# Patient Record
Sex: MALE | Employment: FULL TIME | URBAN - METROPOLITAN AREA
[De-identification: names, ages, dates, MRNs, and addresses within clinical notes are randomized per-mention and may not be internally consistent; named-entity substitution may affect disease eponyms.]

---

## 2023-07-24 RX ORDER — ROSUVASTATIN CALCIUM 10 MG/1
1 TABLET, COATED ORAL NIGHTLY
COMMUNITY
Start: 2020-01-10 | End: 2023-07-25 | Stop reason: SDUPTHER

## 2023-07-24 RX ORDER — DEXTROAMPHETAMINE SACCHARATE, AMPHETAMINE ASPARTATE MONOHYDRATE, DEXTROAMPHETAMINE SULFATE AND AMPHETAMINE SULFATE 2.5; 2.5; 2.5; 2.5 MG/1; MG/1; MG/1; MG/1
1 CAPSULE, EXTENDED RELEASE ORAL DAILY
COMMUNITY
Start: 2018-11-17 | End: 2023-07-25 | Stop reason: SDUPTHER

## 2023-07-24 RX ORDER — LEVOCETIRIZINE DIHYDROCHLORIDE 5 MG/1
1 TABLET, FILM COATED ORAL DAILY
COMMUNITY
Start: 2020-04-27

## 2023-07-25 ENCOUNTER — OFFICE VISIT (OUTPATIENT)
Dept: PRIMARY CARE | Facility: CLINIC | Age: 33
End: 2023-07-25
Payer: COMMERCIAL

## 2023-07-25 VITALS
TEMPERATURE: 97.5 F | BODY MASS INDEX: 31.92 KG/M2 | SYSTOLIC BLOOD PRESSURE: 118 MMHG | HEIGHT: 70 IN | DIASTOLIC BLOOD PRESSURE: 78 MMHG | WEIGHT: 223 LBS

## 2023-07-25 DIAGNOSIS — Z79.899 MEDICATION MANAGEMENT: ICD-10-CM

## 2023-07-25 DIAGNOSIS — F90.0 ATTENTION DEFICIT HYPERACTIVITY DISORDER (ADHD), PREDOMINANTLY INATTENTIVE TYPE: ICD-10-CM

## 2023-07-25 DIAGNOSIS — Z00.00 ROUTINE ADULT HEALTH MAINTENANCE: Primary | ICD-10-CM

## 2023-07-25 DIAGNOSIS — E78.00 HYPERCHOLESTEROLEMIA: Primary | ICD-10-CM

## 2023-07-25 PROBLEM — F98.8 ADD (ATTENTION DEFICIT DISORDER): Status: ACTIVE | Noted: 2023-07-25

## 2023-07-25 PROBLEM — T78.40XA ALLERGIES: Status: ACTIVE | Noted: 2023-07-25

## 2023-07-25 PROBLEM — E55.9 MILD VITAMIN D DEFICIENCY: Status: ACTIVE | Noted: 2023-07-25

## 2023-07-25 PROBLEM — F41.9 ANXIETY: Status: ACTIVE | Noted: 2023-07-25

## 2023-07-25 LAB
AMPHETAMINE (PRESENCE) IN URINE BY SCREEN METHOD: ABNORMAL
BARBITURATES PRESENCE IN URINE BY SCREEN METHOD: ABNORMAL
BENZODIAZEPINE (PRESENCE) IN URINE BY SCREEN METHOD: ABNORMAL
CANNABINOIDS IN URINE BY SCREEN METHOD: ABNORMAL
COCAINE (PRESENCE) IN URINE BY SCREEN METHOD: ABNORMAL
DRUG SCREEN COMMENT URINE: ABNORMAL
FENTANYL URINE: ABNORMAL
METHADONE (PRESENCE) IN URINE BY SCREEN METHOD: ABNORMAL
OPIATES (PRESENCE) IN URINE BY SCREEN METHOD: ABNORMAL
OXYCODONE (PRESENCE) IN URINE BY SCREEN METHOD: ABNORMAL
PHENCYCLIDINE (PRESENCE) IN URINE BY SCREEN METHOD: ABNORMAL

## 2023-07-25 PROCEDURE — 80061 LIPID PANEL: CPT | Performed by: PHYSICIAN ASSISTANT

## 2023-07-25 PROCEDURE — 80349 CANNABINOIDS NATURAL: CPT

## 2023-07-25 PROCEDURE — 99395 PREV VISIT EST AGE 18-39: CPT | Performed by: PHYSICIAN ASSISTANT

## 2023-07-25 PROCEDURE — 80053 COMPREHEN METABOLIC PANEL: CPT | Performed by: PHYSICIAN ASSISTANT

## 2023-07-25 PROCEDURE — 1036F TOBACCO NON-USER: CPT | Performed by: PHYSICIAN ASSISTANT

## 2023-07-25 PROCEDURE — 80307 DRUG TEST PRSMV CHEM ANLYZR: CPT

## 2023-07-25 PROCEDURE — 85025 COMPLETE CBC W/AUTO DIFF WBC: CPT | Performed by: PHYSICIAN ASSISTANT

## 2023-07-25 RX ORDER — DEXTROAMPHETAMINE SACCHARATE, AMPHETAMINE ASPARTATE MONOHYDRATE, DEXTROAMPHETAMINE SULFATE AND AMPHETAMINE SULFATE 2.5; 2.5; 2.5; 2.5 MG/1; MG/1; MG/1; MG/1
10 CAPSULE, EXTENDED RELEASE ORAL DAILY
Qty: 30 CAPSULE | Refills: 0 | Status: SHIPPED | OUTPATIENT
Start: 2023-07-25 | End: 2023-09-07 | Stop reason: SDUPTHER

## 2023-07-25 RX ORDER — ROSUVASTATIN CALCIUM 10 MG/1
10 TABLET, COATED ORAL NIGHTLY
Qty: 90 TABLET | Refills: 3 | Status: SHIPPED | OUTPATIENT
Start: 2023-07-25

## 2023-07-25 ASSESSMENT — ENCOUNTER SYMPTOMS
DIZZINESS: 0
NAUSEA: 0
CHEST TIGHTNESS: 0
POLYPHAGIA: 0
FACIAL SWELLING: 0
TREMORS: 0
JOINT SWELLING: 0
EYE DISCHARGE: 0
CONSTIPATION: 0
CONFUSION: 0
COUGH: 0
HEMATURIA: 0
APPETITE CHANGE: 0
ABDOMINAL PAIN: 0
ARTHRALGIAS: 0
VOMITING: 0
ABDOMINAL DISTENTION: 0
ANAL BLEEDING: 0
CHOKING: 0
WHEEZING: 0
FEVER: 0
POLYDIPSIA: 0
NUMBNESS: 0
DIARRHEA: 0
EYE PAIN: 0
WEAKNESS: 0
LOSS OF SENSATION IN FEET: 0
MYALGIAS: 0
SHORTNESS OF BREATH: 0
DIFFICULTY URINATING: 0
NERVOUS/ANXIOUS: 0
FATIGUE: 0
DEPRESSION: 0
PALPITATIONS: 0
COLOR CHANGE: 0
FREQUENCY: 0
SORE THROAT: 0
SLEEP DISTURBANCE: 0
HEADACHES: 0
OCCASIONAL FEELINGS OF UNSTEADINESS: 0
CHILLS: 0

## 2023-07-25 ASSESSMENT — COLUMBIA-SUICIDE SEVERITY RATING SCALE - C-SSRS
6. HAVE YOU EVER DONE ANYTHING, STARTED TO DO ANYTHING, OR PREPARED TO DO ANYTHING TO END YOUR LIFE?: NO
2. HAVE YOU ACTUALLY HAD ANY THOUGHTS OF KILLING YOURSELF?: NO
1. IN THE PAST MONTH, HAVE YOU WISHED YOU WERE DEAD OR WISHED YOU COULD GO TO SLEEP AND NOT WAKE UP?: NO

## 2023-07-25 ASSESSMENT — PATIENT HEALTH QUESTIONNAIRE - PHQ9
2. FEELING DOWN, DEPRESSED OR HOPELESS: NOT AT ALL
SUM OF ALL RESPONSES TO PHQ9 QUESTIONS 1 AND 2: 0
1. LITTLE INTEREST OR PLEASURE IN DOING THINGS: NOT AT ALL

## 2023-07-25 NOTE — PROGRESS NOTES
Subjective   Patient ID: Mane Kay is a 32 y.o. male with a history of ADD and familial hypercholesterolemia who presents for Annual Exam.    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  He would like to go back on Adderall.  Of note, the patient is off of Adderall for about a year.  The patient ran out of rosuvastatin a month ago. He does not exercise since he had a baby 6 months ago.  Stated he is pretty busy with a new job and a baby.  He is currently on intermittent fasting diet.  He denies smoking, drinking alcohol or using illicit drugs.    He denies shortness of breath or chest pain.  There is no nausea, vomiting, constipation, or diarrhea.  No abdominal pain.  Denies headaches, dizziness, presyncope, or syncope.    Review of Systems   Constitutional:  Negative for appetite change, chills, fatigue and fever.   HENT:  Negative for congestion, ear pain, facial swelling, hearing loss, nosebleeds and sore throat.    Eyes:  Negative for pain, discharge and visual disturbance.   Respiratory:  Negative for cough, choking, chest tightness, shortness of breath and wheezing.    Cardiovascular:  Negative for chest pain, palpitations and leg swelling.   Gastrointestinal:  Negative for abdominal distention, abdominal pain, anal bleeding, constipation, diarrhea, nausea and vomiting.   Endocrine: Negative for cold intolerance, heat intolerance, polydipsia, polyphagia and polyuria.   Genitourinary:  Negative for difficulty urinating, frequency, hematuria and urgency.   Musculoskeletal:  Negative for arthralgias, gait problem, joint swelling and myalgias.   Skin:  Negative for color change and rash.   Neurological:  Negative for dizziness, tremors, syncope, weakness, numbness and headaches.   Psychiatric/Behavioral:  Negative for behavioral problems, confusion, sleep disturbance and suicidal ideas. The patient is not nervous/anxious.        Objective   BP  "118/78   Temp 36.4 °C (97.5 °F)   Ht 1.778 m (5' 10\")   Wt 101 kg (223 lb)   BMI 32.00 kg/m²     Physical Exam  Constitutional:       General: He is not in acute distress.     Appearance: Normal appearance.   HENT:      Head: Normocephalic and atraumatic.      Nose: Nose normal.   Eyes:      Extraocular Movements: Extraocular movements intact.      Conjunctiva/sclera: Conjunctivae normal.      Pupils: Pupils are equal, round, and reactive to light.   Cardiovascular:      Rate and Rhythm: Normal rate and regular rhythm.      Pulses: Normal pulses.      Heart sounds: Normal heart sounds.   Pulmonary:      Effort: Pulmonary effort is normal.      Breath sounds: Normal breath sounds.   Abdominal:      General: Bowel sounds are normal.      Palpations: Abdomen is soft.   Musculoskeletal:         General: Normal range of motion.      Cervical back: Normal range of motion and neck supple.   Neurological:      General: No focal deficit present.      Mental Status: He is alert and oriented to person, place, and time.   Psychiatric:         Mood and Affect: Mood normal.         Behavior: Behavior normal.         Thought Content: Thought content normal.         Judgment: Judgment normal.         Assessment/Plan     Complete physical exam  We obtained fasting blood work including CBC, CMP, lipid panel  Try to exercise 30 minutes daily  Continue intermittent fasting diet    ADD  Unable to focus and concentrate  Was on Adderall XR 10 mg  Unable to tolerate higher doses due to anxiety and jitters  Start Adderall XR 10 mg daily  OARRS reviewed today  Triple threat - No  Polypharmacy  - No   Morphine equivalent dose greater than 80 ? no  overdose Risk: :Less than 400  Urine drug screen today 7/25/2023  Agreement signed today 7/25/2023  Controlled substances History of present illness note form -Yes    Anxiety, panic attacks.  Doing well without medications  Unable to tolerate buspirone due to dizziness and headaches  Continue to " meditate    Hypercholesterolemia  Ran out of rosuvastatin 10 mg daily a month ago  We will obtain lipid panel today  Try to be on low-fat, low calorie diet recommended  Try to exercise at least 30 minutes daily    Seasonal allergies  Failed OTC Benadryl, Zyrtec, Claritin  Continue Xyzal 5 mg once daily  Continue fluticasone nasal spray 2 sprays in each nostril daily  Follow-up with allergy for injections    Vitamin D deficiency  not on vitamin D    Weight maintenance.  BMI 32.00 kg/m²  Low fat, low cholesterol diet, low carbohydrate diet  Exercise 30 minutes daily

## 2023-08-01 LAB — 11-NOR-9-CARBOXY-THC, URN, QUANT: 35 NG/ML

## 2023-08-16 ASSESSMENT — ENCOUNTER SYMPTOMS
FEVER: 0
CONSTIPATION: 0
COUGH: 0
FATIGUE: 0
CHEST TIGHTNESS: 0
ABDOMINAL PAIN: 0
APPETITE CHANGE: 0
POLYDIPSIA: 0
ARTHRALGIAS: 0
DIARRHEA: 0
FACIAL SWELLING: 0
NERVOUS/ANXIOUS: 0
SHORTNESS OF BREATH: 0
DIZZINESS: 0
CHOKING: 0
TREMORS: 0
WEAKNESS: 0
VOMITING: 0
WHEEZING: 0
HEADACHES: 0
CHILLS: 0
NAUSEA: 0
JOINT SWELLING: 0
EYE DISCHARGE: 0
FREQUENCY: 0
PALPITATIONS: 0
ABDOMINAL DISTENTION: 0
SORE THROAT: 0
NUMBNESS: 0
POLYPHAGIA: 0
DIFFICULTY URINATING: 0
EYE PAIN: 0
COLOR CHANGE: 0
SLEEP DISTURBANCE: 0
ANAL BLEEDING: 0
HEMATURIA: 0
MYALGIAS: 0
CONFUSION: 0

## 2023-08-17 ENCOUNTER — APPOINTMENT (OUTPATIENT)
Dept: PRIMARY CARE | Facility: CLINIC | Age: 33
End: 2023-08-17
Payer: COMMERCIAL

## 2023-08-17 ENCOUNTER — TELEMEDICINE (OUTPATIENT)
Dept: PRIMARY CARE | Facility: CLINIC | Age: 33
End: 2023-08-17
Payer: COMMERCIAL

## 2023-08-17 DIAGNOSIS — J32.4 CHRONIC PANSINUSITIS: Primary | ICD-10-CM

## 2023-08-17 PROBLEM — J33.8 NASAL SINUS POLYP: Status: ACTIVE | Noted: 2018-03-07

## 2023-08-17 PROBLEM — J34.3 HYPERTROPHY OF NASAL TURBINATES: Status: ACTIVE | Noted: 2018-03-07

## 2023-08-17 PROCEDURE — 99213 OFFICE O/P EST LOW 20 MIN: CPT | Performed by: PHYSICIAN ASSISTANT

## 2023-08-17 RX ORDER — AZITHROMYCIN 250 MG/1
TABLET, FILM COATED ORAL
Qty: 6 TABLET | Refills: 0 | Status: SHIPPED | OUTPATIENT
Start: 2023-08-17 | End: 2023-08-22

## 2023-08-17 ASSESSMENT — ENCOUNTER SYMPTOMS
COLOR CHANGE: 0
CONSTIPATION: 0
HEMATURIA: 0
ABDOMINAL DISTENTION: 0
SHORTNESS OF BREATH: 0
WHEEZING: 0
DIARRHEA: 0
CHOKING: 0
MYALGIAS: 0
POLYPHAGIA: 0
CONFUSION: 0
NERVOUS/ANXIOUS: 0
DIFFICULTY URINATING: 0
DIZZINESS: 0
PALPITATIONS: 0
SINUS PAIN: 1
FATIGUE: 1
NUMBNESS: 0
FACIAL SWELLING: 0
NAUSEA: 0
COUGH: 0
EYE PAIN: 0
CHEST TIGHTNESS: 0
JOINT SWELLING: 0
VOMITING: 0
SINUS PRESSURE: 1
POLYDIPSIA: 0
ANAL BLEEDING: 0
ABDOMINAL PAIN: 0
ARTHRALGIAS: 0
TREMORS: 0
EYE DISCHARGE: 0
APPETITE CHANGE: 0
WEAKNESS: 0
FEVER: 1
SLEEP DISTURBANCE: 0
FREQUENCY: 0
HEADACHES: 0
SORE THROAT: 1
CHILLS: 0

## 2023-08-17 NOTE — PROGRESS NOTES
Subjective   Patient ID: Mane Kay is a 32 y.o. male with a history of ADD and familial hypercholesterolemia who presents for Sinusitis and Sore Throat.    Virtual or Telephone Consent    An interactive audio and video telecommunication system which permits real time communications between the patient (at the originating site) and provider (at the distant site) was utilized to provide this telehealth service.   Verbal consent was requested and obtained from Mane Kay on this date, 08/17/23 for a telehealth visit.       HPI the patient is complaining of nasal congestion with green nasal discharge, low fever 99.9-100, sore throat, fatigue, and headaches.  He stated he was exposed to his son who was tested negative for COVID and flu.  Per patient, he tested negative for COVID-19 virus as well.  He denies cough, wheezing, shortness of breath or difficulty breathing.    Review of Systems   Constitutional:  Positive for fatigue and fever. Negative for appetite change and chills.   HENT:  Positive for congestion, sinus pressure, sinus pain and sore throat. Negative for ear pain, facial swelling, hearing loss and nosebleeds.    Eyes:  Negative for pain, discharge and visual disturbance.   Respiratory:  Negative for cough, choking, chest tightness, shortness of breath and wheezing.    Cardiovascular:  Negative for chest pain, palpitations and leg swelling.   Gastrointestinal:  Negative for abdominal distention, abdominal pain, anal bleeding, constipation, diarrhea, nausea and vomiting.   Endocrine: Negative for cold intolerance, heat intolerance, polydipsia, polyphagia and polyuria.   Genitourinary:  Negative for difficulty urinating, frequency, hematuria and urgency.   Musculoskeletal:  Negative for arthralgias, gait problem, joint swelling and myalgias.   Skin:  Negative for color change and rash.   Neurological:  Negative for dizziness, tremors, syncope, weakness, numbness and headaches.   Psychiatric/Behavioral:   Negative for behavioral problems, confusion, sleep disturbance and suicidal ideas. The patient is not nervous/anxious.        Objective   There were no vitals taken for this visit.    Physical Exam    Assessment/Plan   Recurrent sinusitis  Start Z-Eduar as directed  Take OTC decongestant  Call if not better    ADD  Unable to focus and concentrate  Was on Adderall XR 10 mg  Unable to tolerate higher doses due to anxiety and jitters  Start Adderall XR 10 mg daily  OARRS reviewed today  Triple threat - No  Polypharmacy  - No   Morphine equivalent dose greater than 80 ? no  overdose Risk: :Less than 400  Urine drug screen today 7/25/2023  Agreement signed today 7/25/2023  Controlled substances History of present illness note form -Yes    Anxiety, panic attacks.  Doing well without medications  Unable to tolerate buspirone due to dizziness and headaches  Continue to meditate    Hypercholesterolemia  Ran out of rosuvastatin 10 mg daily a month ago  We will obtain lipid panel today  Try to be on low-fat, low calorie diet recommended  Try to exercise at least 30 minutes daily    Seasonal allergies  Failed OTC Benadryl, Zyrtec, Claritin  Continue Xyzal 5 mg once daily  Continue fluticasone nasal spray 2 sprays in each nostril daily  Follow-up with allergy for injections    Vitamin D deficiency  not on vitamin D    Weight maintenance.  BMI 32.00 kg/m²  Low fat, low cholesterol diet, low carbohydrate diet  Try to exercise 30 minutes daily  Continue intermittent fasting diet

## 2023-08-17 NOTE — PROGRESS NOTES
Subjective   Patient ID: Mane Kay is a 32 y.o. male with a history of ADD and familial hypercholesterolemia who presents for No chief complaint on file..    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  He would like to go back on Adderall.  Of note, the patient is off of Adderall for about a year.  The patient ran out of rosuvastatin a month ago. He does not exercise since he had a baby 6 months ago.  Stated he is pretty busy with a new job and a baby.  He is currently on intermittent fasting diet.  He denies smoking, drinking alcohol or using illicit drugs.    He denies shortness of breath or chest pain.  There is no nausea, vomiting, constipation, or diarrhea.  No abdominal pain.  Denies headaches, dizziness, presyncope, or syncope.    Review of Systems   Constitutional:  Negative for appetite change, chills, fatigue and fever.   HENT:  Negative for congestion, ear pain, facial swelling, hearing loss, nosebleeds and sore throat.    Eyes:  Negative for pain, discharge and visual disturbance.   Respiratory:  Negative for cough, choking, chest tightness, shortness of breath and wheezing.    Cardiovascular:  Negative for chest pain, palpitations and leg swelling.   Gastrointestinal:  Negative for abdominal distention, abdominal pain, anal bleeding, constipation, diarrhea, nausea and vomiting.   Endocrine: Negative for cold intolerance, heat intolerance, polydipsia, polyphagia and polyuria.   Genitourinary:  Negative for difficulty urinating, frequency, hematuria and urgency.   Musculoskeletal:  Negative for arthralgias, gait problem, joint swelling and myalgias.   Skin:  Negative for color change and rash.   Neurological:  Negative for dizziness, tremors, syncope, weakness, numbness and headaches.   Psychiatric/Behavioral:  Negative for behavioral problems, confusion, sleep disturbance and suicidal ideas. The patient is not nervous/anxious.         Objective   There were no vitals taken for this visit.    Physical Exam  Constitutional:       General: He is not in acute distress.     Appearance: Normal appearance.   HENT:      Head: Normocephalic and atraumatic.      Nose: Nose normal.   Eyes:      Extraocular Movements: Extraocular movements intact.      Conjunctiva/sclera: Conjunctivae normal.      Pupils: Pupils are equal, round, and reactive to light.   Cardiovascular:      Rate and Rhythm: Normal rate and regular rhythm.      Pulses: Normal pulses.      Heart sounds: Normal heart sounds.   Pulmonary:      Effort: Pulmonary effort is normal.      Breath sounds: Normal breath sounds.   Abdominal:      General: Bowel sounds are normal.      Palpations: Abdomen is soft.   Musculoskeletal:         General: Normal range of motion.      Cervical back: Normal range of motion and neck supple.   Neurological:      General: No focal deficit present.      Mental Status: He is alert and oriented to person, place, and time.   Psychiatric:         Mood and Affect: Mood normal.         Behavior: Behavior normal.         Thought Content: Thought content normal.         Judgment: Judgment normal.         Assessment/Plan     Complete physical exam  We obtained fasting blood work including CBC, CMP, lipid panel  Try to exercise 30 minutes daily  Continue intermittent fasting diet    ADD  Unable to focus and concentrate  Was on Adderall XR 10 mg  Unable to tolerate higher doses due to anxiety and jitters  Start Adderall XR 10 mg daily  OARRS reviewed today  Triple threat - No  Polypharmacy  - No   Morphine equivalent dose greater than 80 ? no  overdose Risk: :Less than 400  Urine drug screen today 7/25/2023  Agreement signed today 7/25/2023  Controlled substances History of present illness note form -Yes    Anxiety, panic attacks.  Doing well without medications  Unable to tolerate buspirone due to dizziness and headaches  Continue to  meditate    Hypercholesterolemia  Ran out of rosuvastatin 10 mg daily a month ago  We will obtain lipid panel today  Try to be on low-fat, low calorie diet recommended  Try to exercise at least 30 minutes daily    Seasonal allergies  Failed OTC Benadryl, Zyrtec, Claritin  Continue Xyzal 5 mg once daily  Continue fluticasone nasal spray 2 sprays in each nostril daily  Follow-up with allergy for injections    Vitamin D deficiency  not on vitamin D    Weight maintenance.  BMI 32.00 kg/m²  Low fat, low cholesterol diet, low carbohydrate diet  Exercise 30 minutes daily

## 2023-09-07 DIAGNOSIS — F90.0 ATTENTION DEFICIT HYPERACTIVITY DISORDER (ADHD), PREDOMINANTLY INATTENTIVE TYPE: ICD-10-CM

## 2023-09-07 RX ORDER — DEXTROAMPHETAMINE SACCHARATE, AMPHETAMINE ASPARTATE MONOHYDRATE, DEXTROAMPHETAMINE SULFATE AND AMPHETAMINE SULFATE 2.5; 2.5; 2.5; 2.5 MG/1; MG/1; MG/1; MG/1
10 CAPSULE, EXTENDED RELEASE ORAL DAILY
Qty: 30 CAPSULE | Refills: 0 | Status: SHIPPED | OUTPATIENT
Start: 2023-09-07 | End: 2023-12-07 | Stop reason: SDUPTHER

## 2023-12-06 ENCOUNTER — TELEPHONE (OUTPATIENT)
Dept: PRIMARY CARE | Facility: CLINIC | Age: 33
End: 2023-12-06
Payer: COMMERCIAL

## 2023-12-07 DIAGNOSIS — F90.0 ATTENTION DEFICIT HYPERACTIVITY DISORDER (ADHD), PREDOMINANTLY INATTENTIVE TYPE: ICD-10-CM

## 2023-12-07 RX ORDER — DEXTROAMPHETAMINE SACCHARATE, AMPHETAMINE ASPARTATE MONOHYDRATE, DEXTROAMPHETAMINE SULFATE AND AMPHETAMINE SULFATE 2.5; 2.5; 2.5; 2.5 MG/1; MG/1; MG/1; MG/1
10 CAPSULE, EXTENDED RELEASE ORAL DAILY
Qty: 30 CAPSULE | Refills: 0 | Status: SHIPPED | OUTPATIENT
Start: 2023-12-07

## 2025-01-26 RX ORDER — LIDOCAINE HYDROCHLORIDE 20 MG/ML
SOLUTION OROPHARYNGEAL
COMMUNITY
Start: 2024-02-12 | End: 2025-02-05 | Stop reason: ALTCHOICE

## 2025-01-26 RX ORDER — AZELASTINE HYDROCHLORIDE 0.5 MG/ML
SOLUTION/ DROPS OPHTHALMIC
COMMUNITY
Start: 2024-04-08

## 2025-01-26 RX ORDER — ONDANSETRON 4 MG/1
4 TABLET, FILM COATED ORAL EVERY 8 HOURS PRN
COMMUNITY
Start: 2024-05-04

## 2025-02-05 ENCOUNTER — APPOINTMENT (OUTPATIENT)
Dept: PRIMARY CARE | Facility: CLINIC | Age: 35
End: 2025-02-05
Payer: COMMERCIAL

## 2025-02-05 VITALS
HEIGHT: 70 IN | DIASTOLIC BLOOD PRESSURE: 78 MMHG | BODY MASS INDEX: 29.2 KG/M2 | TEMPERATURE: 97.7 F | SYSTOLIC BLOOD PRESSURE: 118 MMHG | WEIGHT: 204 LBS

## 2025-02-05 DIAGNOSIS — Z00.00 ROUTINE ADULT HEALTH MAINTENANCE: Primary | ICD-10-CM

## 2025-02-05 LAB
ALBUMIN SERPL BCP-MCNC: 4.7 G/DL (ref 3.4–5)
ALP SERPL-CCNC: 51 U/L (ref 45–117)
ALT SERPL W P-5'-P-CCNC: 47 U/L (ref 14–59)
ANION GAP SERPL CALC-SCNC: 14 MMOL/L (ref 10–20)
AST SERPL W P-5'-P-CCNC: 24 U/L (ref 15–37)
BASOPHILS # BLD AUTO: 0.01 X10*3/UL (ref 0.1–1.6)
BASOPHILS NFR BLD AUTO: 0.23 % (ref 0–0.3)
BILIRUB SERPL-MCNC: 1.4 MG/DL (ref 0.2–1)
BUN SERPL-MCNC: 11 MG/DL (ref 7–18)
CALCIUM SERPL-MCNC: 9.7 MG/DL (ref 8.5–10.1)
CHLORIDE SERPL-SCNC: 101 MMOL/L (ref 98–107)
CHOLEST SERPL-MCNC: 260 MG/DL (ref 0–199)
CHOLESTEROL/HDL RATIO: 4.3 (ref 4.2–7)
CO2 SERPL-SCNC: 30 MMOL/L (ref 21–32)
CREAT SERPL-MCNC: 0.97 MG/DL (ref 0.6–1.1)
EGFRCR SERPLBLD CKD-EPI 2021: >90 ML/MIN/1.73M*2
EOSINOPHIL # BLD AUTO: 0.2 X10*3/UL (ref 0.04–0.5)
EOSINOPHIL NFR BLD AUTO: 3.73 % (ref 0.7–7)
ERYTHROCYTE [DISTWIDTH] IN BLOOD BY AUTOMATED COUNT: 13.4 % (ref 11.5–14.5)
GLUCOSE SERPL-MCNC: 93 MG/DL (ref 74–100)
HCT VFR BLD AUTO: 45.2 % (ref 38.4–51.3)
HDLC SERPL-MCNC: 60 MG/DL (ref 40–59)
HGB BLD-MCNC: 16.31 G/DL (ref 12.7–17)
IS PATIENT FASTING: YES
LDLC SERPL DIRECT ASSAY-MCNC: 163 MG/DL (ref 0–100)
LYMPHOCYTES # BLD AUTO: 2.12 X10*3/UL (ref 0–6)
LYMPHOCYTES NFR BLD AUTO: 38.81 % (ref 20.5–51.1)
MCH RBC QN AUTO: 31.5 PG (ref 26–32)
MCHC RBC AUTO-ENTMCNC: 36.1 G/DL (ref 31–38)
MCV RBC AUTO: 87.5 FL (ref 80–96)
MONOCYTES # BLD AUTO: 0.55 X10*3/UL (ref 1.6–24.9)
MONOCYTES NFR BLD AUTO: 10.09 % (ref 1.7–9.3)
NEUTROPHILS # BLD AUTO: 2.58 X10*3/UL (ref 1.4–6.5)
NEUTROPHILS NFR BLD AUTO: 47.14 % (ref 42.2–75.2)
PLATELET # BLD AUTO: 291.3 X10*3/UL (ref 150–450)
PMV BLD AUTO: 8.28 FL (ref 7.8–11)
POTASSIUM SERPL-SCNC: 4.6 MMOL/L (ref 3.5–5.1)
PROT SERPL-MCNC: 8.2 G/DL (ref 6.4–8.2)
RBC # BLD AUTO: 5.17 X10*6/UL (ref 4.1–5.6)
SODIUM SERPL-SCNC: 140 MMOL/L (ref 136–145)
TRIGL SERPL-MCNC: 149 MG/DL
WBC # BLD AUTO: 5.47 X10*3/UL (ref 4.5–10.5)

## 2025-02-05 PROCEDURE — 99395 PREV VISIT EST AGE 18-39: CPT | Performed by: PHYSICIAN ASSISTANT

## 2025-02-05 PROCEDURE — 80061 LIPID PANEL: CPT | Performed by: PHYSICIAN ASSISTANT

## 2025-02-05 PROCEDURE — 80053 COMPREHEN METABOLIC PANEL: CPT | Performed by: PHYSICIAN ASSISTANT

## 2025-02-05 PROCEDURE — 3008F BODY MASS INDEX DOCD: CPT | Performed by: PHYSICIAN ASSISTANT

## 2025-02-05 PROCEDURE — 1036F TOBACCO NON-USER: CPT | Performed by: PHYSICIAN ASSISTANT

## 2025-02-05 PROCEDURE — 85025 COMPLETE CBC W/AUTO DIFF WBC: CPT | Performed by: PHYSICIAN ASSISTANT

## 2025-02-05 RX ORDER — ALBUTEROL SULFATE 90 UG/1
INHALANT RESPIRATORY (INHALATION)
COMMUNITY
Start: 2025-01-02

## 2025-02-05 ASSESSMENT — ENCOUNTER SYMPTOMS
VOMITING: 0
MYALGIAS: 0
CONSTIPATION: 0
HEADACHES: 0
CHOKING: 0
WHEEZING: 0
OCCASIONAL FEELINGS OF UNSTEADINESS: 0
POLYPHAGIA: 0
ARTHRALGIAS: 0
APPETITE CHANGE: 0
PALPITATIONS: 0
NUMBNESS: 0
SORE THROAT: 0
NERVOUS/ANXIOUS: 0
COLOR CHANGE: 0
DEPRESSION: 0
LOSS OF SENSATION IN FEET: 0
ABDOMINAL PAIN: 0
DIZZINESS: 0
EYE DISCHARGE: 0
COUGH: 0
EYE PAIN: 0
DIARRHEA: 0
POLYDIPSIA: 0
JOINT SWELLING: 0
ABDOMINAL DISTENTION: 0
FEVER: 0
CHEST TIGHTNESS: 0
TREMORS: 0
NAUSEA: 0
CONFUSION: 0
FATIGUE: 0
DIFFICULTY URINATING: 0
FREQUENCY: 0
HEMATURIA: 0
FACIAL SWELLING: 0
CHILLS: 0
SHORTNESS OF BREATH: 0
SLEEP DISTURBANCE: 0
ANAL BLEEDING: 0
WEAKNESS: 0

## 2025-02-05 ASSESSMENT — PATIENT HEALTH QUESTIONNAIRE - PHQ9
2. FEELING DOWN, DEPRESSED OR HOPELESS: NOT AT ALL
1. LITTLE INTEREST OR PLEASURE IN DOING THINGS: NOT AT ALL
SUM OF ALL RESPONSES TO PHQ9 QUESTIONS 1 AND 2: 0

## 2025-02-05 ASSESSMENT — COLUMBIA-SUICIDE SEVERITY RATING SCALE - C-SSRS
2. HAVE YOU ACTUALLY HAD ANY THOUGHTS OF KILLING YOURSELF?: NO
1. IN THE PAST MONTH, HAVE YOU WISHED YOU WERE DEAD OR WISHED YOU COULD GO TO SLEEP AND NOT WAKE UP?: NO
6. HAVE YOU EVER DONE ANYTHING, STARTED TO DO ANYTHING, OR PREPARED TO DO ANYTHING TO END YOUR LIFE?: NO

## 2025-02-05 ASSESSMENT — PAIN SCALES - GENERAL: PAINLEVEL_OUTOF10: 0-NO PAIN

## 2025-02-05 NOTE — PROGRESS NOTES
"Subjective   Patient ID: Mane Kay is a 34 y.o. male with a history of ADD and familial hypercholesterolemia who presents for Annual Exam.    HPI the patient is presented for physical exam.  He lost almost 20 pounds by diet.  He does not exercise but tries to be active in summertime. He stopped taking rosuvastatin and Adderall.  Stated that he gets a bad crash from Adderall at the end of the day.  Has hard time focusing and concentrating.  He denies anxiety or depression.  He sleeps well through the night.  Today he denies shortness of breath, chest pain, nausea, vomiting, constipation, diarrhea or abdominal pain.    Review of Systems   Constitutional:  Negative for appetite change, chills, fatigue and fever.   HENT:  Negative for congestion, ear pain, facial swelling, hearing loss, nosebleeds and sore throat.    Eyes:  Negative for pain, discharge and visual disturbance.   Respiratory:  Negative for cough, choking, chest tightness, shortness of breath and wheezing.    Cardiovascular:  Negative for chest pain, palpitations and leg swelling.   Gastrointestinal:  Negative for abdominal distention, abdominal pain, anal bleeding, constipation, diarrhea, nausea and vomiting.   Endocrine: Negative for cold intolerance, heat intolerance, polydipsia, polyphagia and polyuria.   Genitourinary:  Negative for difficulty urinating, frequency, hematuria and urgency.   Musculoskeletal:  Negative for arthralgias, gait problem, joint swelling and myalgias.   Skin:  Negative for color change and rash.   Neurological:  Negative for dizziness, tremors, syncope, weakness, numbness and headaches.   Psychiatric/Behavioral:  Negative for behavioral problems, confusion, sleep disturbance and suicidal ideas. The patient is not nervous/anxious.        Objective   /78   Temp 36.5 °C (97.7 °F) (Temporal)   Ht 1.778 m (5' 10\")   Wt 92.5 kg (204 lb)   BMI 29.27 kg/m²     Physical Exam  Constitutional:       General: He is not in " acute distress.     Appearance: Normal appearance.   HENT:      Head: Normocephalic and atraumatic.      Nose: Nose normal.   Eyes:      Extraocular Movements: Extraocular movements intact.      Conjunctiva/sclera: Conjunctivae normal.      Pupils: Pupils are equal, round, and reactive to light.   Cardiovascular:      Rate and Rhythm: Normal rate and regular rhythm.      Pulses: Normal pulses.      Heart sounds: Normal heart sounds.   Pulmonary:      Effort: Pulmonary effort is normal.      Breath sounds: Normal breath sounds.   Abdominal:      General: Bowel sounds are normal.      Palpations: Abdomen is soft.   Musculoskeletal:         General: Normal range of motion.      Cervical back: Normal range of motion and neck supple.   Neurological:      General: No focal deficit present.      Mental Status: He is alert and oriented to person, place, and time.   Psychiatric:         Mood and Affect: Mood normal.         Behavior: Behavior normal.         Thought Content: Thought content normal.         Judgment: Judgment normal.             Assessment/Plan   Complete physical exam.  We obtained fasting blood work including CBC, CMP, lipid panel.  Exercise at least 30 minutes daily  Continue healthy diet  Follow up with dentist twice a year for dental cleaning     ADD  Off of Adderall XR 10 mg  Has a bed crash at the end of the day  Will consider Vyvanse     Anxiety, panic attacks.  Doing well without medications  Unable to tolerate buspirone due to dizziness and headaches  Continue to meditate    Hypercholesterolemia  Off of rosuvastatin 10 mg daily   low-fat, low calorie diet recommended  Try to exercise at least 30 minutes daily    Seasonal allergies  Failed OTC Benadryl, Zyrtec, Claritin  Continue Xyzal 5 mg as needed  Follow-up with allergy for injections    Vitamin D deficiency  not on vitamin D    Weight maintenance.  BMI 32.00 kg/m²  Low fat, low cholesterol diet, low carbohydrate diet  Try to exercise 30 minutes  daily  Continue intermittent fasting diet    I will call with results    Follow-up annually or as needed

## 2025-03-06 ENCOUNTER — APPOINTMENT (OUTPATIENT)
Dept: PRIMARY CARE | Facility: CLINIC | Age: 35
End: 2025-03-06
Payer: COMMERCIAL

## 2025-03-06 VITALS
DIASTOLIC BLOOD PRESSURE: 72 MMHG | BODY MASS INDEX: 29.2 KG/M2 | TEMPERATURE: 97.7 F | SYSTOLIC BLOOD PRESSURE: 110 MMHG | HEIGHT: 70 IN | WEIGHT: 204 LBS

## 2025-03-06 DIAGNOSIS — F90.0 ATTENTION DEFICIT HYPERACTIVITY DISORDER (ADHD), PREDOMINANTLY INATTENTIVE TYPE: ICD-10-CM

## 2025-03-06 PROCEDURE — 1036F TOBACCO NON-USER: CPT | Performed by: PHYSICIAN ASSISTANT

## 2025-03-06 PROCEDURE — 3008F BODY MASS INDEX DOCD: CPT | Performed by: PHYSICIAN ASSISTANT

## 2025-03-06 PROCEDURE — 99214 OFFICE O/P EST MOD 30 MIN: CPT | Performed by: PHYSICIAN ASSISTANT

## 2025-03-06 RX ORDER — DEXTROAMPHETAMINE SACCHARATE, AMPHETAMINE ASPARTATE MONOHYDRATE, DEXTROAMPHETAMINE SULFATE AND AMPHETAMINE SULFATE 2.5; 2.5; 2.5; 2.5 MG/1; MG/1; MG/1; MG/1
10 CAPSULE, EXTENDED RELEASE ORAL DAILY
Qty: 30 CAPSULE | Refills: 0 | Status: SHIPPED | OUTPATIENT
Start: 2025-03-06

## 2025-03-06 ASSESSMENT — ENCOUNTER SYMPTOMS
DIZZINESS: 0
SORE THROAT: 0
ABDOMINAL PAIN: 0
POLYDIPSIA: 0
APPETITE CHANGE: 0
DIARRHEA: 0
TREMORS: 0
CONFUSION: 0
PALPITATIONS: 0
HEMATURIA: 0
POLYPHAGIA: 0
NUMBNESS: 0
ABDOMINAL DISTENTION: 0
EYE DISCHARGE: 0
CHOKING: 0
FEVER: 0
FATIGUE: 0
HEADACHES: 0
FACIAL SWELLING: 0
EYE PAIN: 0
COUGH: 0
CHEST TIGHTNESS: 0
ARTHRALGIAS: 0
SLEEP DISTURBANCE: 0
CONSTIPATION: 0
CHILLS: 0
DIFFICULTY URINATING: 0
MYALGIAS: 0
FREQUENCY: 0
VOMITING: 0
WEAKNESS: 0
NERVOUS/ANXIOUS: 0
JOINT SWELLING: 0
SHORTNESS OF BREATH: 0
NAUSEA: 0
COLOR CHANGE: 0
ANAL BLEEDING: 0
WHEEZING: 0

## 2025-03-06 ASSESSMENT — COLUMBIA-SUICIDE SEVERITY RATING SCALE - C-SSRS
6. HAVE YOU EVER DONE ANYTHING, STARTED TO DO ANYTHING, OR PREPARED TO DO ANYTHING TO END YOUR LIFE?: NO
1. IN THE PAST MONTH, HAVE YOU WISHED YOU WERE DEAD OR WISHED YOU COULD GO TO SLEEP AND NOT WAKE UP?: NO
2. HAVE YOU ACTUALLY HAD ANY THOUGHTS OF KILLING YOURSELF?: NO

## 2025-03-06 ASSESSMENT — PAIN SCALES - GENERAL: PAINLEVEL_OUTOF10: 0-NO PAIN

## 2025-03-06 NOTE — PROGRESS NOTES
Subjective   Patient ID: Mane Kay is a 34 y.o. male with a history of ADD and familial hypercholesterolemia who presents for Med Refill (Amphetamine).    HPI the patient is presented with complaints of not being able to focus and concentrate since he is off of Adderall.  His last refill was more than a year ago and stated he had hard time at work. He mostly needs it when he is at work and he does not take it during weekends.  OARRS:  Nona Bowden PA-C on 3/6/2025  9:36 AM  I have personally reviewed the OARRS report for Mane Kay. I have considered the risks of abuse, dependence, addiction and diversion    Is the patient prescribed a combination of a benzodiazepine and opioid?  No    Last Urine Drug Screen / ordered today: Yes  No results found for this or any previous visit (from the past 8760 hours).  N/A    Controlled Substance Agreement:  Date of the Last Agreement: 3/6/2025  Reviewed Controlled Substance Agreement including but not limited to the benefits, risks, and alternatives to treatment with a Controlled Substance medication(s).    Stimulants:   What is the patient's goal of therapy?  To improve focus and concentration  Is this being achieved with current treatment?  Yes    Activities of Daily Living:   Is your overall impression that this patient is benefiting (symptom reduction outweighs side effects) from stimulant therapy? Yes     1. Physical Functioning: Better  2. Family Relationship: Same  3. Social Relationship: Same  4. Mood: Better  5. Sleep Patterns: Same  6. Overall Function: Better    Review of Systems   Constitutional:  Negative for appetite change, chills, fatigue and fever.   HENT:  Negative for congestion, ear pain, facial swelling, hearing loss, nosebleeds and sore throat.    Eyes:  Negative for pain, discharge and visual disturbance.   Respiratory:  Negative for cough, choking, chest tightness, shortness of breath and wheezing.    Cardiovascular:  Negative for chest pain,  "palpitations and leg swelling.   Gastrointestinal:  Negative for abdominal distention, abdominal pain, anal bleeding, constipation, diarrhea, nausea and vomiting.   Endocrine: Negative for cold intolerance, heat intolerance, polydipsia, polyphagia and polyuria.   Genitourinary:  Negative for difficulty urinating, frequency, hematuria and urgency.   Musculoskeletal:  Negative for arthralgias, gait problem, joint swelling and myalgias.   Skin:  Negative for color change and rash.   Neurological:  Negative for dizziness, tremors, syncope, weakness, numbness and headaches.   Psychiatric/Behavioral:  Negative for behavioral problems, confusion, sleep disturbance and suicidal ideas. The patient is not nervous/anxious.        Objective   /72 (Patient Position: Sitting)   Temp 36.5 °C (97.7 °F) (Temporal)   Ht 1.778 m (5' 10\")   Wt 92.5 kg (204 lb)   BMI 29.27 kg/m²     Physical Exam  Constitutional:       General: He is not in acute distress.     Appearance: Normal appearance.   HENT:      Head: Normocephalic and atraumatic.      Nose: Nose normal.   Eyes:      Extraocular Movements: Extraocular movements intact.      Conjunctiva/sclera: Conjunctivae normal.      Pupils: Pupils are equal, round, and reactive to light.   Cardiovascular:      Rate and Rhythm: Normal rate and regular rhythm.      Pulses: Normal pulses.      Heart sounds: Normal heart sounds.   Pulmonary:      Effort: Pulmonary effort is normal.      Breath sounds: Normal breath sounds.   Abdominal:      General: Bowel sounds are normal.      Palpations: Abdomen is soft.   Musculoskeletal:         General: Normal range of motion.      Cervical back: Normal range of motion and neck supple.   Neurological:      General: No focal deficit present.      Mental Status: He is alert and oriented to person, place, and time.   Psychiatric:         Mood and Affect: Mood normal.         Behavior: Behavior normal.         Thought Content: Thought content normal.  "        Judgment: Judgment normal.             Assessment/Plan   ADD  Not able to focus and concentrate  Resume Adderall XR 10 mg  OARRS reviewed today  Triple threat - No  Polypharmacy  - No   Morphine equivalent dose greater than 80 ? no  overdose Risk: :Less than 400  Urine drug screen today 3/6/2025  Agreement signed today 3/6/2025  Controlled substances History of present illness note form -Yes    Anxiety, panic attacks.  Stable  Unable to tolerate buspirone due to dizziness and headaches  Continue to meditate    Hypercholesterolemia  Off of rosuvastatin 10 mg daily   continue low-fat, low calorie diet   Try to exercise at least 30 minutes daily    Seasonal allergies  Failed OTC Benadryl, Zyrtec, Claritin  Continue Xyzal 5 mg as needed  Follow-up with allergy for injections    Vitamin D deficiency  not on vitamin D    Weight maintenance.  Body mass index is 29.27 kg/m².  Low fat, low cholesterol diet, low carbohydrate diet  Try to exercise 30 minutes daily  Continue intermittent fasting diet    Follow-up annually or as needed

## 2025-03-08 LAB
AMPHETAMINES UR QL: NEGATIVE NG/ML
BARBITURATES UR QL: NEGATIVE NG/ML
BENZODIAZ UR QL: NEGATIVE NG/ML
BZE UR QL: NEGATIVE NG/ML
CREAT UR-MCNC: 141.2 MG/DL
DRUG SCREEN COMMENT UR-IMP: ABNORMAL
METHADONE UR QL: NEGATIVE NG/ML
OPIATES UR QL: NEGATIVE NG/ML
OXIDANTS UR QL: NEGATIVE MCG/ML
OXYCODONE UR QL: NEGATIVE NG/ML
PCP UR QL: NEGATIVE NG/ML
PH UR: 5.3 [PH] (ref 4.5–9)
QUEST NOTES AND COMMENTS: ABNORMAL
THC UR QL: POSITIVE NG/ML
THC UR-MCNC: 264 NG/ML

## 2025-06-09 ENCOUNTER — APPOINTMENT (OUTPATIENT)
Dept: PRIMARY CARE | Facility: CLINIC | Age: 35
End: 2025-06-09
Payer: COMMERCIAL

## 2025-06-09 ENCOUNTER — ANCILLARY PROCEDURE (OUTPATIENT)
Dept: URGENT CARE | Age: 35
End: 2025-06-09
Payer: COMMERCIAL

## 2025-06-09 ENCOUNTER — OFFICE VISIT (OUTPATIENT)
Dept: URGENT CARE | Age: 35
End: 2025-06-09
Payer: COMMERCIAL

## 2025-06-09 VITALS
OXYGEN SATURATION: 99 % | HEART RATE: 78 BPM | TEMPERATURE: 98.6 F | DIASTOLIC BLOOD PRESSURE: 83 MMHG | RESPIRATION RATE: 18 BRPM | SYSTOLIC BLOOD PRESSURE: 120 MMHG

## 2025-06-09 DIAGNOSIS — S33.5XXA LUMBAR SPRAIN, INITIAL ENCOUNTER: Primary | ICD-10-CM

## 2025-06-09 DIAGNOSIS — S33.5XXA LUMBAR SPRAIN, INITIAL ENCOUNTER: ICD-10-CM

## 2025-06-09 PROCEDURE — 1036F TOBACCO NON-USER: CPT | Performed by: EMERGENCY MEDICINE

## 2025-06-09 PROCEDURE — 72100 X-RAY EXAM L-S SPINE 2/3 VWS: CPT | Performed by: EMERGENCY MEDICINE

## 2025-06-09 PROCEDURE — 99203 OFFICE O/P NEW LOW 30 MIN: CPT | Performed by: EMERGENCY MEDICINE

## 2025-06-09 PROCEDURE — 96372 THER/PROPH/DIAG INJ SC/IM: CPT | Performed by: EMERGENCY MEDICINE

## 2025-06-09 RX ORDER — CYCLOBENZAPRINE HCL 10 MG
5 TABLET ORAL 3 TIMES DAILY
Qty: 15 TABLET | Refills: 0 | Status: SHIPPED | OUTPATIENT
Start: 2025-06-09 | End: 2025-06-19

## 2025-06-09 RX ORDER — KETOROLAC TROMETHAMINE 30 MG/ML
30 INJECTION, SOLUTION INTRAMUSCULAR; INTRAVENOUS ONCE
Status: DISCONTINUED | OUTPATIENT
Start: 2025-06-09 | End: 2025-06-09

## 2025-06-09 RX ORDER — PREDNISONE 20 MG/1
20 TABLET ORAL DAILY
Qty: 5 TABLET | Refills: 0 | Status: SHIPPED | OUTPATIENT
Start: 2025-06-09 | End: 2025-06-14

## 2025-06-09 RX ORDER — KETOROLAC TROMETHAMINE 30 MG/ML
30 INJECTION, SOLUTION INTRAMUSCULAR; INTRAVENOUS ONCE
Status: COMPLETED | OUTPATIENT
Start: 2025-06-09 | End: 2025-06-09

## 2025-06-09 RX ADMIN — KETOROLAC TROMETHAMINE 30 MG: 30 INJECTION, SOLUTION INTRAMUSCULAR; INTRAVENOUS at 10:53

## 2025-06-09 ASSESSMENT — ENCOUNTER SYMPTOMS
ABDOMINAL PAIN: 0
COUGH: 1
DIFFICULTY URINATING: 0
FEVER: 0
NAUSEA: 0
CHILLS: 0
NECK PAIN: 0
BACK PAIN: 1
DIARRHEA: 0
VOMITING: 0
HEADACHES: 0
JOINT SWELLING: 0
SHORTNESS OF BREATH: 0

## 2025-06-09 NOTE — PROGRESS NOTES
Subjective   Patient ID: Mane Kay is a 34 y.o. male. They present today with a chief complaint of Back Pain (Low back pain x 1 day - no injury ).    History of Present Illness  Patient is a 34-year-old male with no significant past medical history.  Patient is complaining lower mid back pain x 12 hours.  Patient states he injured his back when he was coughing.  Patient has 2 doses of Motrin without much relief.  Patient states it hurts to move and bend.  Patient denies any fever, incontinence, saddle anesthesia.          Past Medical History  Allergies as of 06/09/2025 - Reviewed 06/09/2025   Allergen Reaction Noted    Lidocaine Other 07/25/2023       Prescriptions Prior to Admission[1]     Medical History[2]    Surgical History[3]     reports that he has never smoked. He has never been exposed to tobacco smoke. He has never used smokeless tobacco. He reports current alcohol use. He reports current drug use. Drug: Amphetamines.    Review of Systems  Review of Systems   Constitutional:  Negative for chills and fever.   Respiratory:  Positive for cough. Negative for shortness of breath.    Cardiovascular:  Negative for chest pain.   Gastrointestinal:  Negative for abdominal pain, diarrhea, nausea and vomiting.   Genitourinary:  Negative for difficulty urinating.   Musculoskeletal:  Positive for back pain and gait problem. Negative for joint swelling and neck pain.   Skin:  Negative for rash.   Neurological:  Negative for headaches.   All other systems reviewed and are negative.                                 Objective    Vitals:    06/09/25 0845   BP: 120/83   Pulse: 78   Resp: 18   Temp: 37 °C (98.6 °F)   SpO2: 99%     No LMP for male patient.    Physical Exam  Vitals reviewed.   Constitutional:       General: He is not in acute distress.     Appearance: He is not ill-appearing or toxic-appearing.   HENT:      Head: Normocephalic and atraumatic.      Nose: Nose normal.      Mouth/Throat:      Mouth: Mucous  membranes are moist.   Eyes:      Extraocular Movements: Extraocular movements intact.      Conjunctiva/sclera: Conjunctivae normal.   Cardiovascular:      Rate and Rhythm: Normal rate.   Pulmonary:      Effort: Pulmonary effort is normal. No respiratory distress.   Abdominal:      Tenderness: There is no right CVA tenderness or left CVA tenderness.   Musculoskeletal:         General: No swelling, tenderness or deformity.      Cervical back: Normal range of motion.   Skin:     General: Skin is warm and dry.   Neurological:      Mental Status: He is alert and oriented to person, place, and time.   Psychiatric:         Mood and Affect: Mood normal.      this although    Procedures    Point of Care Test & Imaging Results from this visit  No results found for this visit on 06/09/25.   Imaging  XR lumbar spine 2-3 views  Result Date: 6/9/2025  Discogenic degenerative changes as described.     Signed by: Jasmin Brunson 6/9/2025 9:53 AM Dictation workstation:   [id number]      Cardiology, Vascular, and Other Imaging  No other imaging results found for the past 2 days      Diagnostic study results (if any) were reviewed by Destiny Velazquez MD.    Assessment/Plan   Allergies, medications, history, and pertinent labs/EKGs/Imaging reviewed by Destiny Velazquez MD.     Medical Decision Making  Patient is a 34-year-old male with acute low back pain.  Patient was sent for lumbar x-ray.  Patient was given a shot of Toradol IM with good effect. Patient was given medications for home and should follow up with PCP    Orders and Diagnoses  Diagnoses and all orders for this visit:  Lumbar sprain, initial encounter  -     XR lumbar spine 2-3 views; Future  -     ketorolac (Toradol) injection 30 mg      Medical Admin Record      Patient disposition: Home    Electronically signed by Destiny Velazquez MD  9:59 AM           [1] (Not in a hospital admission)   [2]   Past Medical History:  Diagnosis Date    Personal history of other specified  conditions 03/07/2018    History of syncope   [3] No past surgical history on file.